# Patient Record
Sex: FEMALE
[De-identification: names, ages, dates, MRNs, and addresses within clinical notes are randomized per-mention and may not be internally consistent; named-entity substitution may affect disease eponyms.]

---

## 2022-06-03 ENCOUNTER — APPOINTMENT (OUTPATIENT)
Dept: ORTHOPEDIC SURGERY | Facility: CLINIC | Age: 27
End: 2022-06-03

## 2022-06-03 PROBLEM — Z00.00 ENCOUNTER FOR PREVENTIVE HEALTH EXAMINATION: Status: ACTIVE | Noted: 2022-06-03

## 2024-08-13 ENCOUNTER — APPOINTMENT (OUTPATIENT)
Dept: PEDIATRICS | Facility: CLINIC | Age: 29
End: 2024-08-13
Payer: COMMERCIAL

## 2024-08-13 DIAGNOSIS — Z76.81 EXPECTANT PARENT(S) PREBIRTH PEDIATRICIAN VISIT: ICD-10-CM

## 2024-08-13 PROCEDURE — ZZZZZ: CPT

## 2024-08-14 PROBLEM — Z76.81 PEDIATRIC PRE-BIRTH VISIT: Status: ACTIVE | Noted: 2024-08-14

## 2024-08-14 NOTE — DISCUSSION/SUMMARY
[FreeTextEntry1] : Z76.81 Pediatric Prebirth Visit New Patient [id number] Inquiries for Prenatal problems that may have implications for the baby reviewed Breast-feeding best practices, benefits, risks of not breast-feeding also reviewed.  [email or web address] to access good information regarding breast-feeding in the first 3 days of life and beyond provided. Fist day and days of breast feeding summarized.  Importance of maternal self care as a tactic to improve breast feeding, including fixing painful latching, was emphasized. Risks and benefits of vaccines as well as not vaccinating within the timeframe recommended was reviewed.  This included the hepatitis B vaccine in the hospital that will be recommended. How to get in touch with the doctor on-call, and how to access the right online care in a de-escalating manner was reviewed.  Especially, through [email or web address], tools tab, and symptom .  This may decrease the perceived need for unnecessary access to care, and direct toward urgent care more when appropriate.  For example, 100.2 F temperature rectally is not necessarily a fever. Generic searches of fever in an infant may direct parents to emergent care unnecessarily. A variety of other questions were reviewed regarding newborns and  beyond that.  Age and development based approach to parenting reviewed. We will see the baby within approximately  2 days of leaving the hospital. .